# Patient Record
Sex: MALE | Race: WHITE | URBAN - METROPOLITAN AREA
[De-identification: names, ages, dates, MRNs, and addresses within clinical notes are randomized per-mention and may not be internally consistent; named-entity substitution may affect disease eponyms.]

---

## 2017-10-04 ENCOUNTER — OFFICE VISIT (OUTPATIENT)
Dept: DERMATOLOGY | Age: 57
End: 2017-10-04

## 2017-10-04 DIAGNOSIS — Z86.018 HISTORY OF DYSPLASTIC NEVUS: ICD-10-CM

## 2017-10-04 DIAGNOSIS — L85.3 XEROSIS CUTIS: Primary | ICD-10-CM

## 2017-10-04 DIAGNOSIS — D22.9 MULTIPLE NEVI: ICD-10-CM

## 2017-10-04 DIAGNOSIS — L57.0 AK (ACTINIC KERATOSIS): ICD-10-CM

## 2017-10-04 PROCEDURE — G8484 FLU IMMUNIZE NO ADMIN: HCPCS | Performed by: DERMATOLOGY

## 2017-10-04 PROCEDURE — 1036F TOBACCO NON-USER: CPT | Performed by: DERMATOLOGY

## 2017-10-04 PROCEDURE — G8427 DOCREV CUR MEDS BY ELIG CLIN: HCPCS | Performed by: DERMATOLOGY

## 2017-10-04 PROCEDURE — G8421 BMI NOT CALCULATED: HCPCS | Performed by: DERMATOLOGY

## 2017-10-04 PROCEDURE — 99213 OFFICE O/P EST LOW 20 MIN: CPT | Performed by: DERMATOLOGY

## 2017-10-04 PROCEDURE — 3017F COLORECTAL CA SCREEN DOC REV: CPT | Performed by: DERMATOLOGY

## 2017-10-04 RX ORDER — MULTIVIT-MIN/IRON/FOLIC ACID/K 18-600-40
CAPSULE ORAL
COMMUNITY

## 2017-10-04 RX ORDER — ASPIRIN 81 MG/1
81 TABLET ORAL
COMMUNITY

## 2017-10-04 RX ORDER — M-VIT,TX,IRON,MINS/CALC/FOLIC 27MG-0.4MG
1 TABLET ORAL DAILY
COMMUNITY

## 2017-10-04 RX ORDER — TADALAFIL 5 MG/1
5 TABLET ORAL
COMMUNITY
Start: 2017-07-05

## 2017-10-04 NOTE — PROGRESS NOTES
[facility] Dermatology  [address]      Elver Workman  1960    59 y.o. male     Date of Visit: 10/4/2017    Chief Complaint: skin dryness, skin moles    History of Present Illness:    1. He complains of chronic dryness and peeling on the hands. Has not pruritus or pain. 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 2.  He has multiple nevi - not aware of any changes in size, color or shape. 3.  He has a history of a severely dysplastic nevus on the right upper chest that was excised in December 2015. He denies any signs of recurrence. 4.  Unknown duration of a scaly lesion on the right lower forehead. Review of Systems:  Skin: no rash. Past Medical History, Family History, Surgical History, Medications and Allergies reviewed. Past Medical History:   Diagnosis Date    Depression     GERD (gastroesophageal reflux disease)     Hyperlipidemia      History reviewed. No pertinent surgical history. Allergies   Allergen Reactions    Erythromycin      Outpatient Prescriptions Marked as Taking for the 10/4/17 encounter (Office Visit) with Kaelyn Callejas MD   Medication Sig Dispense Refill    Omega-3 Fatty Acids (FISH OIL PO) Take by mouth      Multiple Vitamins-Minerals (THERAPEUTIC MULTIVITAMIN-MINERALS) tablet Take 1 tablet by mouth daily      tadalafil (CIALIS) 5 MG tablet Take 5 mg by mouth      aspirin 81 MG EC tablet Take 81 mg by mouth      Cholecalciferol (VITAMIN D) 2000 units CAPS capsule Take by mouth      rosuvastatin (CRESTOR) 5 MG tablet Take 5 mg by mouth      pantoprazole (PROTONIX) 40 MG tablet Take 10 mg by mouth daily       escitalopram (LEXAPRO) 5 MG tablet Take 5 mg by mouth daily         Social History:  Occupation:  Owns a storage business.       Physical Examination       The following were examined and determined to be normal: Psych/Neuro, Scalp/hair, Conjunctivae/eyelids, Gums/teeth/lips, Neck, Breast/axilla/chest, Abdomen, Back, RLE and LLE. The following were examined and determined to be abnormal: Head/face, RUE, LUE and Nails/digits. Well appearing. 1.  Palmar surfaces of the hands and fingers    2. Trunk and extremities with several well defined round to oval smooth brown macules and papules. 3.  Right chest - linear surgical scar. 4.  Right lower forehead just above the lateral brow - 1 ill defined irreg shaped keratotic pink macule. Assessment and Plan     1. Exfoliative scaling more likely to be due to irritants    Frequently moisturize hands. Gloves with wet work. 2. Multiple nevi - benign appearing    Encouraged self skin exams and sun protective behaviors. 3. History of dysplastic nevus - clear today    Observe. 4.  Actinic keratosis -     We discussed the relation to chronic cumulative sun exposure and the low premalignant potential.     Counseling regarding sun protective behaviors was performed including sun avoidance, hats and sunscreen. Return in about 1 year (around 10/4/2018).

## 2018-10-17 ENCOUNTER — OFFICE VISIT (OUTPATIENT)
Dept: DERMATOLOGY | Age: 58
End: 2018-10-17
Payer: COMMERCIAL

## 2018-10-17 DIAGNOSIS — L82.1 SK (SEBORRHEIC KERATOSIS): Primary | ICD-10-CM

## 2018-10-17 DIAGNOSIS — D22.9 MULTIPLE NEVI: ICD-10-CM

## 2018-10-17 DIAGNOSIS — L57.0 AK (ACTINIC KERATOSIS): ICD-10-CM

## 2018-10-17 DIAGNOSIS — Z86.018 HISTORY OF DYSPLASTIC NEVUS: ICD-10-CM

## 2018-10-17 PROCEDURE — 17000 DESTRUCT PREMALG LESION: CPT | Performed by: DERMATOLOGY

## 2018-10-17 PROCEDURE — 99213 OFFICE O/P EST LOW 20 MIN: CPT | Performed by: DERMATOLOGY

## 2018-10-17 PROCEDURE — 17003 DESTRUCT PREMALG LES 2-14: CPT | Performed by: DERMATOLOGY

## 2018-10-17 PROCEDURE — G8484 FLU IMMUNIZE NO ADMIN: HCPCS | Performed by: DERMATOLOGY

## 2018-10-17 PROCEDURE — G8421 BMI NOT CALCULATED: HCPCS | Performed by: DERMATOLOGY

## 2018-10-17 PROCEDURE — 3017F COLORECTAL CA SCREEN DOC REV: CPT | Performed by: DERMATOLOGY

## 2018-10-17 PROCEDURE — G8427 DOCREV CUR MEDS BY ELIG CLIN: HCPCS | Performed by: DERMATOLOGY

## 2018-10-17 PROCEDURE — 1036F TOBACCO NON-USER: CPT | Performed by: DERMATOLOGY

## 2018-10-17 NOTE — PROGRESS NOTES
[facility] Dermatology  Martin Ayoub Means  1960    59 y.o. male     Date of Visit: 10/17/2018    Chief Complaint: skin lesions    History of Present Illness:    1. He complains of an asymptomatic persistent lesion on the right mid back. 2.  Follow-up for history of actinic keratoses-has 2 new lesions on the left temple today. 3.  He has multiple nevi and a history of a severely dysplastic nevus on the right upper chest that was excised in December 2015. He is not aware of any changes in size, color or shape. Review of Systems:  Skin: No rash. Past Medical History, Family History, Surgical History, Medications and Allergies reviewed. Past Medical History:   Diagnosis Date    Depression     GERD (gastroesophageal reflux disease)     Hyperlipidemia      History reviewed. No pertinent surgical history. Allergies   Allergen Reactions    Erythromycin      Outpatient Prescriptions Marked as Taking for the 10/17/18 encounter (Office Visit) with Glory Lester MD   Medication Sig Dispense Refill    Omega-3 Fatty Acids (FISH OIL PO) Take by mouth      Multiple Vitamins-Minerals (THERAPEUTIC MULTIVITAMIN-MINERALS) tablet Take 1 tablet by mouth daily      tadalafil (CIALIS) 5 MG tablet Take 5 mg by mouth      aspirin 81 MG EC tablet Take 81 mg by mouth      Cholecalciferol (VITAMIN D) 2000 units CAPS capsule Take by mouth      rosuvastatin (CRESTOR) 5 MG tablet Take 5 mg by mouth      pantoprazole (PROTONIX) 40 MG tablet Take 10 mg by mouth daily       escitalopram (LEXAPRO) 5 MG tablet Take 5 mg by mouth daily         Social History:  Occupation:  Recently sold storage business. Physical Examination       The following were examined and determined to be normal: Psych/Neuro, Scalp/hair, Conjunctivae/eyelids, Gums/teeth/lips, Neck, Breast/axilla/chest, Abdomen, Back, RUE, LUE, RLE, LLE and Nails/digits.     The following were examined and

## 2019-10-23 ENCOUNTER — OFFICE VISIT (OUTPATIENT)
Dept: DERMATOLOGY | Age: 59
End: 2019-10-23
Payer: COMMERCIAL

## 2019-10-23 ENCOUNTER — PATIENT MESSAGE (OUTPATIENT)
Dept: DERMATOLOGY | Age: 59
End: 2019-10-23

## 2019-10-23 DIAGNOSIS — Z86.018 HISTORY OF DYSPLASTIC NEVUS: ICD-10-CM

## 2019-10-23 DIAGNOSIS — D22.9 MULTIPLE NEVI: Primary | ICD-10-CM

## 2019-10-23 DIAGNOSIS — L82.1 SK (SEBORRHEIC KERATOSIS): ICD-10-CM

## 2019-10-23 PROCEDURE — 99213 OFFICE O/P EST LOW 20 MIN: CPT | Performed by: DERMATOLOGY

## 2019-10-23 PROCEDURE — G8484 FLU IMMUNIZE NO ADMIN: HCPCS | Performed by: DERMATOLOGY

## 2019-10-23 PROCEDURE — G8427 DOCREV CUR MEDS BY ELIG CLIN: HCPCS | Performed by: DERMATOLOGY

## 2019-10-23 PROCEDURE — 3017F COLORECTAL CA SCREEN DOC REV: CPT | Performed by: DERMATOLOGY

## 2019-10-23 PROCEDURE — 1036F TOBACCO NON-USER: CPT | Performed by: DERMATOLOGY

## 2019-10-23 PROCEDURE — G8421 BMI NOT CALCULATED: HCPCS | Performed by: DERMATOLOGY

## 2020-10-28 ENCOUNTER — OFFICE VISIT (OUTPATIENT)
Dept: DERMATOLOGY | Age: 60
End: 2020-10-28
Payer: COMMERCIAL

## 2020-10-28 PROCEDURE — 3017F COLORECTAL CA SCREEN DOC REV: CPT | Performed by: DERMATOLOGY

## 2020-10-28 PROCEDURE — G8421 BMI NOT CALCULATED: HCPCS | Performed by: DERMATOLOGY

## 2020-10-28 PROCEDURE — G8427 DOCREV CUR MEDS BY ELIG CLIN: HCPCS | Performed by: DERMATOLOGY

## 2020-10-28 PROCEDURE — G8484 FLU IMMUNIZE NO ADMIN: HCPCS | Performed by: DERMATOLOGY

## 2020-10-28 PROCEDURE — 1036F TOBACCO NON-USER: CPT | Performed by: DERMATOLOGY

## 2020-10-28 PROCEDURE — 99213 OFFICE O/P EST LOW 20 MIN: CPT | Performed by: DERMATOLOGY

## 2020-10-28 NOTE — PROGRESS NOTES
[facility] Dermatology  Troy Davis  1960    56 y.o. male     Date of Visit: 10/28/2020    Chief Complaint: skin moles    History of Present Illness:    1. He has multiple nevi - not aware of any changes in size, color or shape. 2.  He complains of a longstanding asymptomatic lesion on the back. 3.  He has a history of a severely dysplastic nevus on the right upper chest that was excised in December 2015. He denies any signs of recurrence. 4.  He also complains of some redness and irritation in the groin. Worse with exercise. Review of Systems:  Skin: no other rash or skin concerns. Past Medical History, Family History, Surgical History, Medications and Allergies reviewed. Past Medical History:   Diagnosis Date    Depression     GERD (gastroesophageal reflux disease)     Hyperlipidemia      History reviewed. No pertinent surgical history. Allergies   Allergen Reactions    Erythromycin      Outpatient Medications Marked as Taking for the 10/28/20 encounter (Office Visit) with Mar Lugo MD   Medication Sig Dispense Refill    Omega-3 Fatty Acids (FISH OIL PO) Take by mouth      Multiple Vitamins-Minerals (THERAPEUTIC MULTIVITAMIN-MINERALS) tablet Take 1 tablet by mouth daily      tadalafil (CIALIS) 5 MG tablet Take 5 mg by mouth      aspirin 81 MG EC tablet Take 81 mg by mouth      Cholecalciferol (VITAMIN D) 2000 units CAPS capsule Take by mouth      rosuvastatin (CRESTOR) 5 MG tablet Take 5 mg by mouth      pantoprazole (PROTONIX) 40 MG tablet Take 10 mg by mouth daily       escitalopram (LEXAPRO) 5 MG tablet Take 5 mg by mouth daily             Physical Examination       The following were examined and determined to be normal: Psych/Neuro, Scalp/hair, Head/face, Conjunctivae/eyelids, Gums/teeth/lips, Neck, Breast/axilla/chest, Abdomen, Back, RUE, LUE and Nails/digits.     The following were examined and determined to be abnormal: RLE, LLE and Genitalia/groin/buttocks. Well appearing. 1.  Trunk and extremities with multiple well defined round to oval smooth brown macules and papules. 2.  Right mid lateral back with a stuck on appearing waxy brown papule. 3.  Right upper chest - linear surgical scar. 4.  Proximal inner thighs/inguinal crease - erythematous patch with some scaling. Assessment and Plan     1. Multiple nevi - benign appearing    Sun protective behaviors and self skin examinations were encouraged. Call for any new or concerning lesions. 2. SK (seborrheic keratosis)     Reassurance. 3. History of severely dysplastic nevus - no signs of recurrence. Sun protective behaviors and self skin examinations were encouraged. Call for any new or concerning lesions. 4. Intertrigo - he's not bothered right now    Consider low potency topical in the future if needed. Return in about 1 year (around 10/28/2021).

## 2020-10-28 NOTE — PATIENT INSTRUCTIONS
Sun Protection Tips    · Apply a broad spectrum water resistant sunscreen with an SPF of at least 30 to exposed areas of the skin. Dont forget the ears and lips! Remember to reapply sunscreen about every 2 hours and after swimming or sweating. · Wear sun protective clothing. Swim shirts (AKA, rash guards) are a great idea and negate the need to reapply sunscreen in those areas.      · Seek the shade whenever possible especially between the hours of 10 am and 4 pm when the suns rays are the strongest.     · Avoid tanning beds

## 2021-11-03 ENCOUNTER — OFFICE VISIT (OUTPATIENT)
Dept: DERMATOLOGY | Age: 61
End: 2021-11-03
Payer: COMMERCIAL

## 2021-11-03 VITALS — TEMPERATURE: 97.2 F

## 2021-11-03 DIAGNOSIS — L81.4 SOLAR LENTIGO: ICD-10-CM

## 2021-11-03 DIAGNOSIS — L21.9 SEBORRHEIC DERMATITIS: ICD-10-CM

## 2021-11-03 DIAGNOSIS — Z86.018 HISTORY OF DYSPLASTIC NEVUS: ICD-10-CM

## 2021-11-03 DIAGNOSIS — D22.9 MULTIPLE NEVI: Primary | ICD-10-CM

## 2021-11-03 PROCEDURE — 99213 OFFICE O/P EST LOW 20 MIN: CPT | Performed by: DERMATOLOGY

## 2021-11-03 PROCEDURE — 1036F TOBACCO NON-USER: CPT | Performed by: DERMATOLOGY

## 2021-11-03 PROCEDURE — G8484 FLU IMMUNIZE NO ADMIN: HCPCS | Performed by: DERMATOLOGY

## 2021-11-03 PROCEDURE — G8427 DOCREV CUR MEDS BY ELIG CLIN: HCPCS | Performed by: DERMATOLOGY

## 2021-11-03 PROCEDURE — 3017F COLORECTAL CA SCREEN DOC REV: CPT | Performed by: DERMATOLOGY

## 2021-11-03 PROCEDURE — G8421 BMI NOT CALCULATED: HCPCS | Performed by: DERMATOLOGY

## 2021-11-03 RX ORDER — TAMSULOSIN HYDROCHLORIDE 0.4 MG/1
0.4 CAPSULE ORAL DAILY
COMMUNITY

## 2021-11-03 NOTE — PROGRESS NOTES
[facility] Dermatology  ChristieMartin Moran  1960    59 y.o. male     Date of Visit: 11/3/2021    Chief Complaint: skin moles    History of Present Illness:    1. He has multiple nevi - not aware of any changes in size, color or shape. 2.  He has stable pigmented lesions on the upper chest.     3.  He complains of some itching on the scalp. 4.  He has a history of a severely dysplastic nevus on the right upper chest that was excised in December 2015. Review of Systems:  Gen: Feels well, good sense of health. Past Medical History, Family History, Surgical History, Medications and Allergies reviewed. Past Medical History:   Diagnosis Date    Depression     GERD (gastroesophageal reflux disease)     Hyperlipidemia      History reviewed. No pertinent surgical history.     Allergies   Allergen Reactions    Erythromycin      Outpatient Medications Marked as Taking for the 11/3/21 encounter (Office Visit) with Mookie Madden MD   Medication Sig Dispense Refill    Probiotic Product (PROBIOTIC-10 PO) Take by mouth      Misc Natural Products (GLUCOSAMINE CHOND CMP ADVANCED PO) Take by mouth      tamsulosin (FLOMAX) 0.4 MG capsule Take 0.4 mg by mouth daily      Omega-3 Fatty Acids (FISH OIL PO) Take by mouth      Multiple Vitamins-Minerals (THERAPEUTIC MULTIVITAMIN-MINERALS) tablet Take 1 tablet by mouth daily      aspirin 81 MG EC tablet Take 81 mg by mouth      Cholecalciferol (VITAMIN D) 2000 units CAPS capsule Take by mouth      rosuvastatin (CRESTOR) 5 MG tablet Take 5 mg by mouth      pantoprazole (PROTONIX) 40 MG tablet Take 10 mg by mouth daily       escitalopram (LEXAPRO) 5 MG tablet Take 5 mg by mouth daily           Physical Examination       The following were examined and determined to be normal: Psych/Neuro, Head/face, Conjunctivae/eyelids, Gums/teeth/lips, Neck, Breast/axilla/chest, Abdomen, Back, RUE, LUE, RLE, LLE and Nails/digits. The following were examined and determined to be abnormal: Scalp/hair. Well appearing. 1.  Trunk and extremities with multiple well defined round to oval smooth brown macules and papules. 2.  Upper chest with several round smooth light brown macules. 3.  Left parietal scalp with mild erythema and scaling. 4.  Right upper chest - linear surgical scar. Assessment and Plan     1. Multiple nevi - benign appearing    Sun protective behaviors, including use of at least SPF 30 sunscreen, and self skin examinations were encouraged. Call for any new or concerning lesions. 2. Solar lentigines    Monitor for change. 3. Seborrheic dermatitis of the scalp - very mild    Start using Head and Shoulders shampoo. 4. History of severely dysplastic nevus - clear    Sun protective behaviors, including use of at least SPF 30 sunscreen, and self skin examinations were encouraged. Call for any new or concerning lesions. Return in about 1 year (around 11/3/2022).     --Royal Lanette MD

## 2022-11-09 ENCOUNTER — OFFICE VISIT (OUTPATIENT)
Dept: DERMATOLOGY | Age: 62
End: 2022-11-09
Payer: COMMERCIAL

## 2022-11-09 DIAGNOSIS — D22.9 MULTIPLE NEVI: Primary | ICD-10-CM

## 2022-11-09 DIAGNOSIS — L82.1 SK (SEBORRHEIC KERATOSIS): ICD-10-CM

## 2022-11-09 DIAGNOSIS — L81.4 SOLAR LENTIGO: ICD-10-CM

## 2022-11-09 PROCEDURE — 3017F COLORECTAL CA SCREEN DOC REV: CPT | Performed by: DERMATOLOGY

## 2022-11-09 PROCEDURE — G8427 DOCREV CUR MEDS BY ELIG CLIN: HCPCS | Performed by: DERMATOLOGY

## 2022-11-09 PROCEDURE — 99213 OFFICE O/P EST LOW 20 MIN: CPT | Performed by: DERMATOLOGY

## 2022-11-09 PROCEDURE — G8421 BMI NOT CALCULATED: HCPCS | Performed by: DERMATOLOGY

## 2022-11-09 PROCEDURE — G8484 FLU IMMUNIZE NO ADMIN: HCPCS | Performed by: DERMATOLOGY

## 2022-11-09 PROCEDURE — 1036F TOBACCO NON-USER: CPT | Performed by: DERMATOLOGY

## 2022-11-09 NOTE — PROGRESS NOTES
Conjunctivae/eyelids, Gums/teeth/lips, Neck, Breast/axilla/chest, Abdomen, Back, RUE, LUE, RLE, LLE, and Nails/digits. The following were examined and determined to be abnormal: None. Well-appearing. 1.  Scattered on the trunk and extremities are multiple well-defined round of uniformly brown macules and few papules. 2.  Shoulders with scattered round smooth light brown macules and patches. 3.  Right mid lateral portion of the back with a stuck on appearing brown papule. Assessment and Plan     1. Multiple nevi - benign appearing    Sun protective behaviors, including use of at least SPF 30 sunscreen, and self skin examinations were encouraged. Call for any new or concerning lesions. 2. Solar lentigines    Monitor for change. Sun protective behaviors encouraged including use of at least SPF 30 or more sunscreen. 3. SK (seborrheic keratosis)     Reassurance. Return in about 1 year (around 11/9/2023).     --Memory Crigler, MD

## 2023-11-15 ENCOUNTER — OFFICE VISIT (OUTPATIENT)
Dept: DERMATOLOGY | Age: 63
End: 2023-11-15
Payer: COMMERCIAL

## 2023-11-15 DIAGNOSIS — D22.9 MULTIPLE NEVI: Primary | ICD-10-CM

## 2023-11-15 DIAGNOSIS — L82.1 SK (SEBORRHEIC KERATOSIS): ICD-10-CM

## 2023-11-15 DIAGNOSIS — L57.0 AK (ACTINIC KERATOSIS): ICD-10-CM

## 2023-11-15 PROCEDURE — 99213 OFFICE O/P EST LOW 20 MIN: CPT | Performed by: DERMATOLOGY

## 2023-11-15 PROCEDURE — 1123F ACP DISCUSS/DSCN MKR DOCD: CPT | Performed by: DERMATOLOGY

## 2023-11-15 NOTE — PROGRESS NOTES
[facility] Dermatology  Cortney Mitchell [facility]      Refsteve Roddy  1960    70 y.o. male     Date of Visit: 11/15/2023    Chief Complaint: skin moles    History of Present Illness:    1. He presents today for evaluation of multiple moles - not aware of any changes in size, color or shape. 2.  He has a larger asymptomatic growth on the back. 3.  He has few asymptomatic scaly lesions on the left temple. He has a history of a severely dysplastic nevus on the right upper chest that was excised in December 2015. He has a son with a history of melanoma. Review of Systems:  Gen: Feels well, good sense of health. Past Medical History, Family History, Surgical History, Medications and Allergies reviewed. Past Medical History:   Diagnosis Date    Depression     GERD (gastroesophageal reflux disease)     Hyperlipidemia      History reviewed. No pertinent surgical history.     Allergies   Allergen Reactions    Erythromycin      Outpatient Medications Marked as Taking for the 11/15/23 encounter (Office Visit) with Andre Nunez MD   Medication Sig Dispense Refill    Probiotic Product (PROBIOTIC-10 PO) Take by mouth      Misc Natural Products (GLUCOSAMINE CHOND CMP ADVANCED PO) Take by mouth      tamsulosin (FLOMAX) 0.4 MG capsule Take 1 capsule by mouth daily      Omega-3 Fatty Acids (FISH OIL PO) Take by mouth      Multiple Vitamins-Minerals (THERAPEUTIC MULTIVITAMIN-MINERALS) tablet Take 1 tablet by mouth daily      tadalafil (CIALIS) 5 MG tablet Take 1 tablet by mouth      aspirin 81 MG EC tablet Take 1 tablet by mouth      Cholecalciferol (VITAMIN D) 2000 units CAPS capsule Take by mouth      rosuvastatin (CRESTOR) 5 MG tablet Take 1 tablet by mouth      pantoprazole (PROTONIX) 40 MG tablet Take 10 mg by mouth daily       escitalopram (LEXAPRO) 5 MG tablet Take 1 tablet by mouth daily           Physical Examination       The following were examined and determined to be

## 2024-09-04 ENCOUNTER — HOSPITAL ENCOUNTER (EMERGENCY)
Age: 64
Discharge: HOME | End: 2024-09-05
Attending: STUDENT IN AN ORGANIZED HEALTH CARE EDUCATION/TRAINING PROGRAM
Payer: MEDICARE

## 2024-09-04 VITALS
BODY MASS INDEX: 27.1 KG/M2 | RESPIRATION RATE: 18 BRPM | HEIGHT: 70 IN | TEMPERATURE: 98.5 F | SYSTOLIC BLOOD PRESSURE: 135 MMHG | OXYGEN SATURATION: 96 % | HEART RATE: 64 BPM | WEIGHT: 189.3 LBS | DIASTOLIC BLOOD PRESSURE: 96 MMHG

## 2024-09-04 DIAGNOSIS — R06.6 SINGULTUS: Primary | ICD-10-CM

## 2024-09-04 PROCEDURE — 99285 EMERGENCY DEPT VISIT HI MDM: CPT

## 2024-09-04 ASSESSMENT — PAIN - FUNCTIONAL ASSESSMENT: PAIN_FUNCTIONAL_ASSESSMENT: NONE - DENIES PAIN

## 2024-09-05 ENCOUNTER — APPOINTMENT (OUTPATIENT)
Dept: GENERAL RADIOLOGY | Age: 64
End: 2024-09-05
Payer: MEDICARE

## 2024-09-05 LAB
ALBUMIN SERPL-MCNC: 4.1 G/DL (ref 3.4–5)
ALBUMIN/GLOB SERPL: 1.3 {RATIO} (ref 1.1–2.2)
ALP SERPL-CCNC: 99 U/L (ref 40–129)
ALT SERPL-CCNC: 8 U/L (ref 10–40)
ANION GAP SERPL CALCULATED.3IONS-SCNC: 9 MMOL/L (ref 3–16)
AST SERPL-CCNC: 27 U/L (ref 15–37)
BASOPHILS # BLD: 0 K/UL (ref 0–0.2)
BASOPHILS NFR BLD: 0.6 %
BILIRUB SERPL-MCNC: 0.5 MG/DL (ref 0–1)
BUN SERPL-MCNC: 13 MG/DL (ref 7–20)
CALCIUM SERPL-MCNC: 8.8 MG/DL (ref 8.3–10.6)
CHLORIDE SERPL-SCNC: 99 MMOL/L (ref 99–110)
CO2 SERPL-SCNC: 27 MMOL/L (ref 21–32)
CREAT SERPL-MCNC: 0.9 MG/DL (ref 0.8–1.3)
DEPRECATED RDW RBC AUTO: 13.2 % (ref 12.4–15.4)
EKG ATRIAL RATE: 61 BPM
EKG DIAGNOSIS: NORMAL
EKG P AXIS: 50 DEGREES
EKG P-R INTERVAL: 166 MS
EKG Q-T INTERVAL: 404 MS
EKG QRS DURATION: 88 MS
EKG QTC CALCULATION (BAZETT): 406 MS
EKG R AXIS: 17 DEGREES
EKG T AXIS: 27 DEGREES
EKG VENTRICULAR RATE: 61 BPM
EOSINOPHIL # BLD: 0.1 K/UL (ref 0–0.6)
EOSINOPHIL NFR BLD: 1.6 %
GFR SERPLBLD CREATININE-BSD FMLA CKD-EPI: >90 ML/MIN/{1.73_M2}
GLUCOSE SERPL-MCNC: 97 MG/DL (ref 70–99)
HCT VFR BLD AUTO: 42.3 % (ref 40.5–52.5)
HGB BLD-MCNC: 14.6 G/DL (ref 13.5–17.5)
LYMPHOCYTES # BLD: 2.7 K/UL (ref 1–5.1)
LYMPHOCYTES NFR BLD: 34.1 %
MCH RBC QN AUTO: 32.1 PG (ref 26–34)
MCHC RBC AUTO-ENTMCNC: 34.5 G/DL (ref 31–36)
MCV RBC AUTO: 93.1 FL (ref 80–100)
MONOCYTES # BLD: 0.7 K/UL (ref 0–1.3)
MONOCYTES NFR BLD: 8.5 %
NEUTROPHILS # BLD: 4.4 K/UL (ref 1.7–7.7)
NEUTROPHILS NFR BLD: 55.2 %
PLATELET # BLD AUTO: 224 K/UL (ref 135–450)
PMV BLD AUTO: 8.9 FL (ref 5–10.5)
POTASSIUM SERPL-SCNC: 4.3 MMOL/L (ref 3.5–5.1)
PROT SERPL-MCNC: 7.2 G/DL (ref 6.4–8.2)
RBC # BLD AUTO: 4.54 M/UL (ref 4.2–5.9)
SODIUM SERPL-SCNC: 135 MMOL/L (ref 136–145)
WBC # BLD AUTO: 8 K/UL (ref 4–11)

## 2024-09-05 PROCEDURE — 85025 COMPLETE CBC W/AUTO DIFF WBC: CPT

## 2024-09-05 PROCEDURE — 80053 COMPREHEN METABOLIC PANEL: CPT

## 2024-09-05 PROCEDURE — 2580000003 HC RX 258: Performed by: STUDENT IN AN ORGANIZED HEALTH CARE EDUCATION/TRAINING PROGRAM

## 2024-09-05 PROCEDURE — 6370000000 HC RX 637 (ALT 250 FOR IP): Performed by: STUDENT IN AN ORGANIZED HEALTH CARE EDUCATION/TRAINING PROGRAM

## 2024-09-05 PROCEDURE — 93005 ELECTROCARDIOGRAM TRACING: CPT | Performed by: STUDENT IN AN ORGANIZED HEALTH CARE EDUCATION/TRAINING PROGRAM

## 2024-09-05 PROCEDURE — 71045 X-RAY EXAM CHEST 1 VIEW: CPT

## 2024-09-05 RX ORDER — CHLORPROMAZINE HYDROCHLORIDE 25 MG/1
25 TABLET, FILM COATED ORAL ONCE
Status: COMPLETED | OUTPATIENT
Start: 2024-09-05 | End: 2024-09-05

## 2024-09-05 RX ORDER — GABAPENTIN 300 MG/1
300 CAPSULE ORAL 3 TIMES DAILY PRN
Qty: 9 CAPSULE | Refills: 0 | Status: SHIPPED | OUTPATIENT
Start: 2024-09-05 | End: 2024-09-08

## 2024-09-05 RX ORDER — CHLORPROMAZINE HYDROCHLORIDE 25 MG/1
25 TABLET, FILM COATED ORAL 3 TIMES DAILY
Qty: 9 TABLET | Refills: 0 | Status: SHIPPED | OUTPATIENT
Start: 2024-09-05 | End: 2024-09-08

## 2024-09-05 RX ORDER — SODIUM CHLORIDE, SODIUM LACTATE, POTASSIUM CHLORIDE, AND CALCIUM CHLORIDE .6; .31; .03; .02 G/100ML; G/100ML; G/100ML; G/100ML
1000 INJECTION, SOLUTION INTRAVENOUS ONCE
Status: COMPLETED | OUTPATIENT
Start: 2024-09-05 | End: 2024-09-05

## 2024-09-05 RX ORDER — PANTOPRAZOLE SODIUM 20 MG/1
20 TABLET, DELAYED RELEASE ORAL ONCE
Status: COMPLETED | OUTPATIENT
Start: 2024-09-05 | End: 2024-09-05

## 2024-09-05 RX ORDER — GABAPENTIN 300 MG/1
300 CAPSULE ORAL ONCE
Status: COMPLETED | OUTPATIENT
Start: 2024-09-05 | End: 2024-09-05

## 2024-09-05 RX ADMIN — GABAPENTIN 300 MG: 300 CAPSULE ORAL at 00:56

## 2024-09-05 RX ADMIN — SODIUM CHLORIDE, POTASSIUM CHLORIDE, SODIUM LACTATE AND CALCIUM CHLORIDE 1000 ML: 600; 310; 30; 20 INJECTION, SOLUTION INTRAVENOUS at 00:54

## 2024-09-05 RX ADMIN — CHLORPROMAZINE HYDROCHLORIDE 25 MG: 25 TABLET, FILM COATED ORAL at 01:00

## 2024-09-05 RX ADMIN — PANTOPRAZOLE SODIUM 20 MG: 20 TABLET, DELAYED RELEASE ORAL at 01:00

## 2024-09-05 NOTE — ED PROVIDER NOTES
[facility]  EMERGENCY DEPARTMENT ENCOUNTER          ATTENDING PHYSICIAN NOTE       Date of evaluation: 9/4/2024    Chief Complaint     Hiccups (Pt. Presents to ED with c/o hiccups for past 36 hours. PCP recommended he take two pantoprazole today. States he did, but has had no relief. Took his wife's cyclobenzaprine, 5mg at 1800, and 5mg at 2000. Around 2030 vomited which relieved his hiccups for only a couple hours.  )      History of Present Illness     Caleb Samson is a 64 y.o. male who presents with chief complaint of persistent hiccups.  Patient reports that he has been experiencing hiccups for the past 36 hours.  No fevers, chills, chest pain, shortness of breath, nausea or vomiting, abdominal pain, extremity swelling.  Patient reports history of acid reflux for which she has increase his pantoprazole use.  Patient also reports taking 1 cyclobenzaprine without improvement in symptoms.  No history of recent weight loss, rashes or skin changes.      ASSESSMENT / PLAN  (MEDICAL DECISION MAKING)     INITIAL VITALS: BP: (!) 135/96, Temp: 98.5 °F (36.9 °C), Pulse: 64, Respirations: 18, SpO2: 96 %      Caleb Samson is a 64 y.o. male presenting with chief complaint of intractable hiccups.  Overall examination is reassuring.  Abdomen is soft and nontender.  Laboratory workup is unrevealing.  Chest x-ray does demonstrate a right lower lobe infiltrate, but no clinical signs of pneumonia.  Recommend close follow-up with his primary care provider and repeat chest x-ray to evaluate for resolution of this finding.  Patient was given Thorazine, gabapentin, pantoprazole with resolution of his hiccups.  Will prescribe him a short course of Thorazine and gabapentin for home use in the event that hiccups recur.  Telemetry had brief episode of recognized V. tach, however on my review this was artifact from underlying hiccups, and there is no evidence of arrhythmia on telemetry.  Patient appropriate for discharge at this

## 2024-09-05 NOTE — DISCHARGE INSTRUCTIONS
We saw you in the hospital for hiccups.     You were treated with thorazine, gabapentin, Protonix, IV fluids.     You were prescribed a short course of thorazine and gabapentin for home use to treat hiccups. Your X-ray showed evidence of right lower lung airspace disease. Follow up with your primary care physician for re-evaluation and repeat chest x-ray.    You need to see your regular doctor in 7 days to be checked. Please contact your regular doctor and schedule an appointment.    You should return to the emergency department if your symptoms worsen or do not resolve. In addition, return if:  - You have a fever (greater than 101 degrees)  - You have chest pain, shortness of breath, abdominal pain, or uncontrollable vomiting  - You are unable to eat or drink  - You pass out  - You have difficulty moving your arms or legs   - You have difficulty speaking or slurred speech  - Or you have any concern that you feel needs immediate physician evaluation.

## 2024-09-05 NOTE — ED NOTES
Patient prepared for and ready to be discharged. Dressed in clothes and given belongings.  IV removed, pt tolerated well, no complications.  Patient discharged at this time in no acute distress after pt verbalized understanding of discharge instructions.   Reviewed medications, and when to return to the ED with patient. Encouraged follow up with PCP  Patient walked to [facility].     Escuadra, Marylee, RN  09/05/24 0201

## 2025-05-28 ENCOUNTER — OFFICE VISIT (OUTPATIENT)
Age: 65
End: 2025-05-28
Payer: MEDICARE

## 2025-05-28 DIAGNOSIS — Z87.2 HISTORY OF ACTINIC KERATOSES: ICD-10-CM

## 2025-05-28 DIAGNOSIS — D22.9 MULTIPLE NEVI: Primary | ICD-10-CM

## 2025-05-28 DIAGNOSIS — L82.1 SK (SEBORRHEIC KERATOSIS): ICD-10-CM

## 2025-05-28 PROCEDURE — 99213 OFFICE O/P EST LOW 20 MIN: CPT | Performed by: DERMATOLOGY

## 2025-05-28 PROCEDURE — 1123F ACP DISCUSS/DSCN MKR DOCD: CPT | Performed by: DERMATOLOGY

## 2025-05-28 PROCEDURE — 1159F MED LIST DOCD IN RCRD: CPT | Performed by: DERMATOLOGY

## 2025-05-28 NOTE — PROGRESS NOTES
[facility] Dermatology  Bao Zhang MD  [phone]      Caleb Samson  1960    65 y.o. male     Date of Visit: 5/28/2025    Chief Complaint: skin moles    History of Present Illness:    1.  He presents today for evaluation of multiple moles - not aware of any changes in size, color or shape.       2.  He has a stable growth on the back.     3.  He has a history of actinic keratoses - not aware of any new lesions.     He has a history of a severely dysplastic nevus on the right upper chest that was excised in December 2015.     He has a son with a history of melanoma.       Review of Systems:  Gen: Feels well, good sense of health.    Past Medical History, Family History, Surgical History, Medications and Allergies reviewed.    Past Medical History:   Diagnosis Date    Depression     GERD (gastroesophageal reflux disease)     Hyperlipidemia      No past surgical history on file.    Allergies   Allergen Reactions    Erythromycin      Outpatient Medications Marked as Taking for the 5/28/25 encounter (Office Visit) with Bao Zhang MD   Medication Sig Dispense Refill    Probiotic Product (PROBIOTIC-10 PO) Take by mouth      Misc Natural Products (GLUCOSAMINE CHOND CMP ADVANCED PO) Take by mouth      tamsulosin (FLOMAX) 0.4 MG capsule Take 1 capsule by mouth daily      Omega-3 Fatty Acids (FISH OIL PO) Take by mouth      Multiple Vitamins-Minerals (THERAPEUTIC MULTIVITAMIN-MINERALS) tablet Take 1 tablet by mouth daily      tadalafil (CIALIS) 5 MG tablet Take 1 tablet by mouth      aspirin 81 MG EC tablet Take 1 tablet by mouth      Cholecalciferol (VITAMIN D) 2000 units CAPS capsule Take by mouth      rosuvastatin (CRESTOR) 5 MG tablet Take 1 tablet by mouth      pantoprazole (PROTONIX) 40 MG tablet Take 10 mg by mouth daily       escitalopram (LEXAPRO) 5 MG tablet Take 1 tablet by mouth daily           Physical Examination       Well appearing.    1.  Trunk and extremities with multiple well